# Patient Record
Sex: FEMALE | URBAN - METROPOLITAN AREA
[De-identification: names, ages, dates, MRNs, and addresses within clinical notes are randomized per-mention and may not be internally consistent; named-entity substitution may affect disease eponyms.]

---

## 2023-03-30 ENCOUNTER — HOME HEALTH ADMISSION (OUTPATIENT)
Dept: HOME HEALTH SERVICES | Facility: HOME HEALTH | Age: 88
End: 2023-03-30
Payer: MEDICARE

## 2023-03-31 ENCOUNTER — HOME CARE VISIT (OUTPATIENT)
Dept: SCHEDULING | Facility: HOME HEALTH | Age: 88
End: 2023-03-31
Payer: MEDICARE

## 2023-03-31 ENCOUNTER — HOME CARE VISIT (OUTPATIENT)
Dept: HOME HEALTH SERVICES | Facility: HOME HEALTH | Age: 88
End: 2023-03-31
Payer: MEDICARE

## 2023-03-31 VITALS
SYSTOLIC BLOOD PRESSURE: 159 MMHG | DIASTOLIC BLOOD PRESSURE: 65 MMHG | OXYGEN SATURATION: 98 % | RESPIRATION RATE: 18 BRPM | TEMPERATURE: 97.4 F | HEART RATE: 83 BPM

## 2023-03-31 PROCEDURE — G0162 HHC RN E&M PLAN SVS, 15 MIN: HCPCS

## 2023-03-31 PROCEDURE — G0152 HHCP-SERV OF OT,EA 15 MIN: HCPCS

## 2023-03-31 ASSESSMENT — ENCOUNTER SYMPTOMS
STOOL DESCRIPTION: FIRM
PAIN LOCATION - PAIN QUALITY: ACHE
CONSTIPATION: 1

## 2023-04-03 ENCOUNTER — HOME CARE VISIT (OUTPATIENT)
Dept: SCHEDULING | Facility: HOME HEALTH | Age: 88
End: 2023-04-03
Payer: MEDICARE

## 2023-04-03 VITALS
TEMPERATURE: 97.5 F | DIASTOLIC BLOOD PRESSURE: 62 MMHG | HEART RATE: 89 BPM | SYSTOLIC BLOOD PRESSURE: 94 MMHG | OXYGEN SATURATION: 98 % | RESPIRATION RATE: 18 BRPM

## 2023-04-03 PROCEDURE — G0151 HHCP-SERV OF PT,EA 15 MIN: HCPCS

## 2023-04-03 ASSESSMENT — ENCOUNTER SYMPTOMS: PAIN LOCATION - PAIN QUALITY: ACHE

## 2023-04-03 NOTE — HOME HEALTH
Pt is a 79 yo RHD female referred to PT s/p spinal fusion and was in rehab for 2 weeks. Pt lives in [address] [place] [address] and uses golf cart for mobility and ambulates with 422. Pt states   PMH: Kyphoplasty, L partial knee arthroplasty, macular degeneration, B LE neuropathy. Pt presents with forward head, forward lumbar flexion with gait. Pt demonstrates BLE muscle weakness, impaired dynamic balance, limited standing tolerance and decreased endurance causing difficulty performing safe functional transfers from various surfaces throughout home, standing activities and bed mobility without assistance as able to do in PLOF. TUG is 25 which indicates a high fall risk. Pt is homebound due to being at high risk for falls and requires the assistance of another person to leave home safely.

## 2023-04-04 ENCOUNTER — HOME CARE VISIT (OUTPATIENT)
Dept: HOME HEALTH SERVICES | Facility: HOME HEALTH | Age: 88
End: 2023-04-04
Payer: MEDICARE

## 2023-04-04 PROCEDURE — G0299 HHS/HOSPICE OF RN EA 15 MIN: HCPCS

## 2023-04-06 NOTE — HOME HEALTH
Damaris Weaver Pt (DNR) Admitted for SN OBS s/p L-1 compression fracture repair with healed wound to low back area     PSH: Kyphoplasty, Laminectomy.  PMH: Spinal stenosis, DJD (lumbar), polyneuropathy (feet), Hypothyroidism, muscular weakness, HLP, DARREN, DEP, LBP, & OA      A & O x 4, no HI/SI, VSS, no c/o pain currently however has pain intermittently in back, A  Fib with adequate peripheral perfusion and no edema, Lungs CTA, Bowel sounds audible/active in all 4 quadrants  ABD soft/nontender & not distended, Pt is continent of B/B, integumentary system is intact with the exception of a healed surgical incision to low back, pt is independent at home and ambulates with a walker for safety     Pt lives in [place] [id number] with 24-hour assistance if needed      Medications reconciled and all are available in home     Pt educated on s/s of infection/depression, medications (to include high risk meds opioids, antiplatelet), safety and fall prevention   Pt voiced comprehension and taught back topics     HEP PT/OT to eval and treat      Plan for next visit: Discharge    Discharge planning discussed with pt, discharge when all goals are met
Psych/Behavioral

## 2023-04-07 ENCOUNTER — HOME CARE VISIT (OUTPATIENT)
Dept: HOME HEALTH SERVICES | Facility: HOME HEALTH | Age: 88
End: 2023-04-07
Payer: MEDICARE

## 2023-04-08 VITALS
RESPIRATION RATE: 16 BRPM | OXYGEN SATURATION: 96 % | HEART RATE: 79 BPM | DIASTOLIC BLOOD PRESSURE: 58 MMHG | TEMPERATURE: 96.9 F | SYSTOLIC BLOOD PRESSURE: 106 MMHG

## 2023-04-08 NOTE — HOME HEALTH
Kellee Christina Pt (DNR) Admitted for SN OBS s/p L-1 compression fracture repair with healed wound to low back area     Pt lives in [place] [id number] with 24-hour assistance if needed      Medications reconciled and all are available in home     Pt educated on s/s of infection/depression, medications (to include high risk meds opioids, antiplatelet), safety and fall prevention   Pt voiced comprehension and taught back topics     PT/OT in on case for reconditioning/strengthening    All goals for SN have been met    Pt discharged

## 2023-04-13 ENCOUNTER — HOME CARE VISIT (OUTPATIENT)
Dept: HOME HEALTH SERVICES | Facility: HOME HEALTH | Age: 88
End: 2023-04-13
Payer: MEDICARE

## 2023-04-14 ENCOUNTER — HOME CARE VISIT (OUTPATIENT)
Dept: HOME HEALTH SERVICES | Facility: HOME HEALTH | Age: 88
End: 2023-04-14
Payer: MEDICARE

## 2023-04-19 ENCOUNTER — HOME CARE VISIT (OUTPATIENT)
Dept: HOME HEALTH SERVICES | Facility: HOME HEALTH | Age: 88
End: 2023-04-19
Payer: MEDICARE

## 2023-04-22 ENCOUNTER — HOME CARE VISIT (OUTPATIENT)
Dept: HOME HEALTH SERVICES | Facility: HOME HEALTH | Age: 88
End: 2023-04-22
Payer: MEDICARE

## 2023-04-24 ENCOUNTER — HOME CARE VISIT (OUTPATIENT)
Dept: SCHEDULING | Facility: HOME HEALTH | Age: 88
End: 2023-04-24
Payer: MEDICARE

## 2023-04-24 PROCEDURE — G0151 HHCP-SERV OF PT,EA 15 MIN: HCPCS

## 2023-04-24 NOTE — HOME HEALTH
Pt was not seen for DC secondary to moving out of service area. Spoke with son, Fam Lucas, and pt has moved it [facility] in [place], [place].

## 2023-04-24 NOTE — HOME HEALTH
Unable to make contact with patient. Phone service --> No \"routes found\", as stated by phone company.

## 2023-04-27 ENCOUNTER — HOME CARE VISIT (OUTPATIENT)
Dept: HOME HEALTH SERVICES | Facility: HOME HEALTH | Age: 88
End: 2023-04-27
Payer: MEDICARE